# Patient Record
Sex: MALE | Race: OTHER | HISPANIC OR LATINO | URBAN - METROPOLITAN AREA
[De-identification: names, ages, dates, MRNs, and addresses within clinical notes are randomized per-mention and may not be internally consistent; named-entity substitution may affect disease eponyms.]

---

## 2021-10-16 ENCOUNTER — EMERGENCY (EMERGENCY)
Facility: HOSPITAL | Age: 31
LOS: 0 days | Discharge: HOME | End: 2021-10-16
Attending: STUDENT IN AN ORGANIZED HEALTH CARE EDUCATION/TRAINING PROGRAM | Admitting: STUDENT IN AN ORGANIZED HEALTH CARE EDUCATION/TRAINING PROGRAM
Payer: SELF-PAY

## 2021-10-16 VITALS
OXYGEN SATURATION: 97 % | DIASTOLIC BLOOD PRESSURE: 72 MMHG | RESPIRATION RATE: 18 BRPM | HEART RATE: 73 BPM | WEIGHT: 139.99 LBS | TEMPERATURE: 98 F | SYSTOLIC BLOOD PRESSURE: 117 MMHG

## 2021-10-16 VITALS
SYSTOLIC BLOOD PRESSURE: 106 MMHG | DIASTOLIC BLOOD PRESSURE: 53 MMHG | OXYGEN SATURATION: 100 % | RESPIRATION RATE: 18 BRPM | HEART RATE: 91 BPM
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
## 2021-10-16 DIAGNOSIS — R41.82 ALTERED MENTAL STATUS, UNSPECIFIED: ICD-10-CM
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
## 2021-10-16 DIAGNOSIS — F10.129 ALCOHOL ABUSE WITH INTOXICATION, UNSPECIFIED: ICD-10-CM

## 2021-10-16 DIAGNOSIS — S01.01XA LACERATION WITHOUT FOREIGN BODY OF SCALP, INITIAL ENCOUNTER: ICD-10-CM
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
## 2021-10-16 DIAGNOSIS — S01.91XA LACERATION WITHOUT FOREIGN BODY OF UNSPECIFIED PART OF HEAD, INITIAL ENCOUNTER: ICD-10-CM

## 2021-10-16 DIAGNOSIS — S09.90XA UNSPECIFIED INJURY OF HEAD, INITIAL ENCOUNTER: ICD-10-CM

## 2021-10-16 PROCEDURE — 71045 X-RAY EXAM CHEST 1 VIEW: CPT | Mod: 26

## 2021-10-16 PROCEDURE — 99285 EMERGENCY DEPT VISIT HI MDM: CPT | Mod: 25

## 2021-10-16 PROCEDURE — 12004 RPR S/N/AX/GEN/TRK7.6-12.5CM: CPT

## 2021-10-16 PROCEDURE — 70450 CT HEAD/BRAIN W/O DYE: CPT | Mod: 26,MA

## 2021-10-16 PROCEDURE — 72170 X-RAY EXAM OF PELVIS: CPT | Mod: 26

## 2021-10-16 PROCEDURE — 72125 CT NECK SPINE W/O DYE: CPT | Mod: 26,MA

## 2021-10-16 PROCEDURE — 99053 MED SERV 10PM-8AM 24 HR FAC: CPT

## 2021-10-16 RX ORDER — MIDAZOLAM HYDROCHLORIDE 1 MG/ML
5 INJECTION, SOLUTION INTRAMUSCULAR; INTRAVENOUS ONCE
Refills: 0 | Status: DISCONTINUED | OUTPATIENT
Start: 2021-10-16 | End: 2021-10-16

## 2021-10-16 RX ADMIN — MIDAZOLAM HYDROCHLORIDE 5 MILLIGRAM(S): 1 INJECTION, SOLUTION INTRAMUSCULAR; INTRAVENOUS at 05:09

## 2021-10-16 NOTE — ED PROVIDER NOTE - NSFOLLOWUPCLINICS_GEN_ALL_ED_FT
[facility]  Medicine  [address], [place]   Phone: [phone]  Fax:   Follow Up Time: 1-3 Days

## 2021-10-16 NOTE — ED PROVIDER NOTE - PHYSICAL EXAMINATION
CONSTITUTIONAL: intoxicated-appearing  SKIN: warm, dry  HEAD: Normocephalic; +2 4cm lacerations posterior head  EYES: no conjunctival injection  ENT: No nasal discharge  NECK: Supple  CARD: S1, S2 normal; no murmurs, gallops, or rubs. Regular rate and rhythm.   RESP: No wheezes, rales or rhonchi.  ABD: soft ntnd. BS+ in all 4 quadrants.  EXT: Normal ROM.  No clubbing, cyanosis or edema.   PSYCH: Uncooperative, agitated

## 2021-10-16 NOTE — ED PROVIDER NOTE - PATIENT PORTAL LINK FT
You can access the FollowMyHealth Patient Portal offered by [facility] by registering at the following website: [email or web address]. By joining [facility]’s FollowMyHealth portal, you will also be able to view your health information using other applications (apps) compatible with our system.

## 2021-10-16 NOTE — CONSULT NOTE ADULT - ASSESSMENT
ASSESSMENT:  31M w/ no known PMH/PSH  presents to the ED as a trauma alert s/p being found down by neighbors and unconscious.  Trauma workup negative, cleared for discharge from a surgery standpoint.    Plan:  - Cleared from a Trauma perspective  - Dispo per ED      Discussed with senior resident  --------------------------------------------------------------------------------------  10-16-21 @ 06:03    TRAUMA SENIOR SPECTRA: [id number]  TRAUMA TEAM SPECTRA: [id number]     ASSESSMENT:  31M w/ no known PMH/PSH  presents to the ED as a trauma alert s/p being found down by neighbors and unconscious.  Trauma workup negative, cleared for discharge from a surgery standpoint.    Plan:  - CTH and Neck negative   - Cleared from a Trauma perspective  - Dispo per ED      Discussed with senior resident  --------------------------------------------------------------------------------------  10-16-21 @ 06:03    TRAUMA SENIOR SPECTRA: [id number]  TRAUMA TEAM SPECTRA: [id number]    Senior Resident Note  Pt seen and examined - 31M found at bottom of stairs, with posterior scalp lac stapled in ED Trauma workup negative, cleared, dispo per ED  Above note has been reviewed and edited  Plan d/w patient and Dr lisbeth Obrien

## 2021-10-16 NOTE — ED PROVIDER NOTE - PROGRESS NOTE DETAILS
MM: Patient agitated and not cooperative with history taking or exam. Patient swinging at staff, ripping off C collar, and attempting to jump out of bed to leave. 5mg of versed was given in order to obtain imaging. MM: 9 staples were placed on lacerations on right posterior occiput. Trav: Pt observed walking to bathroom, but appeared still a bit unsteady. Will continue to observe and reassess. CP: Patient alert and oriented, and walking steadily unassisted. Given strict return precautions. Patient agreeable to plan.

## 2021-10-16 NOTE — CONSULT NOTE ADULT - SUBJECTIVE AND OBJECTIVE BOX
TRAUMA ACTIVATION LEVEL:  ALERT   ACTIVATED BY: ED  INTUBATED: NO      MECHANISM OF INJURY:   [] Blunt     [] MVC	  [X] Fall	  [] Pedestrian Struck	  [] Motorcycle     [] Assault     [] Bicycle collision    [] Sports injury    [] Penetrating    [] Gun Shot Wound      [] Stab Wound    GCS: 15 	E: 4	V: 5	M: 6    HPI:    31yM w/ unknown PMH/PSH who presents to the ED as a trauma alert s/p being found down by neighbors and unconscious.  Reported to be in his own blood and emesis.  Upon EMS arrival patient was awake and alert but intoxicated appearing.  In the ED patient was in C-Collar but combative and intoxicated appearing.  Patient agitated and ripped off C-Collar multiple times.  Posterior scalp laceration observed, no active bleeding. no other obvious traumatic injuries.    PAST MEDICAL & SURGICAL HISTORY:      Allergies    No Known Allergies    Intolerances        Home Medications:      ROS: 10-system review is otherwise negative except HPI above.      Primary Survey:    A - airway intact  B - bilateral breath sounds and good chest rise  C - palpable pulses in all extremities  D - GCS 15 on arrival, PANCHAL  Exposure obtained    Vital Signs Last 24 Hrs  T(C): 36.4 (16 Oct 2021 04:28), Max: 36.4 (16 Oct 2021 04:28)  T(F): 97.5 (16 Oct 2021 04:28), Max: 97.5 (16 Oct 2021 04:28)  HR: 119 (16 Oct 2021 04:35) (73 - 119)  BP: 112/66 (16 Oct 2021 04:35) (112/66 - 117/72)  BP(mean): --  RR: 18 (16 Oct 2021 04:35) (18 - 18)  SpO2: 100% (16 Oct 2021 04:35) (97% - 100%)    Secondary Survey:   General: NAD, intoxicated, agitated  HEENT: Small posterior scalp laceration  Neck: Soft, midline trachea. no c-spine tenderness  Chest: No chest wall tenderness, no subcutaneous emphysema   Cardiac: RR  Respiratory: Unlabored breathing at rest  Abdomen: Soft, non-distended, non-tender, no rebound, no guarding.  Groin: Normal appearing, pelvis stable   Ext:  Moving b/l upper and lower extremities. Palpable Radial b/l UE, b/l DP palpable in LE.   Back: No T/L/S spine tenderness, No palpable runoff/stepoff/deformity      Labs:  CAPILLARY BLOOD GLUCOSE      POCT Blood Glucose.: 96 mg/dL (16 Oct 2021 04:33)              RADIOLOGY & ADDITIONAL STUDIES:  ---------------------------------------------------------------------------------------  < from: Xray Pelvis AP only (10.16.21 @ 05:29) >    IMPRESSION:    Telemetry leads overlie the right hemipelvis and hip. No acute fracture or dislocation.    --- End of Report ---      < end of copied text >  < from: CT Cervical Spine No Cont (10.16.21 @ 05:17) >    IMPRESSION:    No evidence of acute fracture of the cervical spine.    --- End of Report ---      < end of copied text >  < from: CT Head No Cont (10.16.21 @ 05:17) >    IMPRESSION:      No CT evidence of acute intracranial hemorrhage, midline shift, or mass effect.    Large right parietal scalp hematoma.    --- End of Report ---    < end of copied text >       TRAUMA ACTIVATION LEVEL:  ALERT   ACTIVATED BY: ED  INTUBATED: NO      MECHANISM OF INJURY:   [] Blunt     [] MVC	  [X] Fall	  [] Pedestrian Struck	  [] Motorcycle     [] Assault     [] Bicycle collision    [] Sports injury    [] Penetrating    [] Gun Shot Wound      [] Stab Wound    GCS: 15 	E: 4	V: 5	M: 6    HPI:    31yM w/ unknown PMH/PSH who presents to the ED as a trauma alert s/p being found down by neighbors and unconscious.  Reported to be in his own blood and emesis.  Upon EMS arrival patient was awake and alert but intoxicated appearing.  In the ED patient was in C-Collar but combative and intoxicated appearing.  Patient agitated and ripped off C-Collar multiple times.  Posterior scalp laceration observed, no active bleeding. no other obvious traumatic injuries.    PAST MEDICAL & SURGICAL HISTORY:      Allergies: No Known Allergies    Home Medications:      ROS: 10-system review is otherwise negative except HPI above.      Primary Survey:    A - airway intact  B - bilateral breath sounds and good chest rise  C - palpable pulses in all extremities  D - GCS 15 on arrival, PANCHAL  Exposure obtained    Vital Signs Last 24 Hrs  T(C): 36.4 (16 Oct 2021 04:28), Max: 36.4 (16 Oct 2021 04:28)  T(F): 97.5 (16 Oct 2021 04:28), Max: 97.5 (16 Oct 2021 04:28)  HR: 119 (16 Oct 2021 04:35) (73 - 119)  BP: 112/66 (16 Oct 2021 04:35) (112/66 - 117/72)  BP(mean): --  RR: 18 (16 Oct 2021 04:35) (18 - 18)  SpO2: 100% (16 Oct 2021 04:35) (97% - 100%)    Secondary Survey:   General: NAD, intoxicated, agitated  HEENT: Small posterior scalp laceration  Neck: Soft, midline trachea. no c-spine tenderness  Chest: No chest wall tenderness, no subcutaneous emphysema   Cardiac: RR  Respiratory: Unlabored breathing at rest  Abdomen: Soft, non-distended, non-tender, no rebound, no guarding.  Groin: Normal appearing, pelvis stable   Ext:  Moving b/l upper and lower extremities. Palpable Radial b/l UE, b/l DP palpable in LE.   Back: No T/L/S spine tenderness, No palpable runoff/stepoff/deformity    RADIOLOGY & ADDITIONAL STUDIES:  ---------------------------------------------------------------------------------------  < from: Xray Pelvis AP only (10.16.21 @ 05:29) >  IMPRESSION:  Telemetry leads overlie the right hemipelvis and hip. No acute fracture or dislocation.  --- End of Report ---    < end of copied text >  < from: CT Cervical Spine No Cont (10.16.21 @ 05:17) >  IMPRESSION:  No evidence of acute fracture of the cervical spine.  --- End of Report ---    < end of copied text >  < from: CT Head No Cont (10.16.21 @ 05:17) >  IMPRESSION:  No CT evidence of acute intracranial hemorrhage, midline shift, or mass effect.  Large right parietal scalp hematoma.  --- End of Report ---  < end of copied text >

## 2021-10-16 NOTE — ED PROVIDER NOTE - NSFOLLOWUPINSTRUCTIONS_ED_ALL_ED_FT
Please return for staple removal in 7-10 days.      Traumatismo craneal    LO QUE NECESITA SABER:    Un traumatismo craneal puede incluir el cuero cabelludo, la jodie, el cráneo o el cerebro y puede variar de leve a grave. Los efectos pueden aparecer inmediatamente después de la lesión o desarrollarse más tarde. Los efectos pueden durar poco tiempo o ser permanentes. Es posible que los médicos quieran revisar holland recuperación con el tiempo. El tratamiento puede cambiar a medida que usted se recupera o desarrolla nuevos problemas de donaldo por el traumatismo craneal.    INSTRUCCIONES SOBRE EL KRISTINA HOSPITALARIA:    Llame al número local de emergencias (911 en los [place]), o pídale a alguien que llame si:  •No es posible despertarlo.      •Usted sufre kendrick convulsión.      •Usted angely de reaccionar cuando le hablan o se desmaya.      •Usted tiene visión borrosa o doble.      •Usted arrastra las palabras o se confunde al hablar.      •Usted tiene debilidad en holland brazo o pierna, pierde la sensación o presenta nuevos problemas de coordinación.      •Dixie pupilas son más grandes de lo habitual o kendrick pupila es de tamaño diferente de la otra.      •A usted le sale keyur o un líquido aldo de los oídos o la nariz.      Regrese a la raj de emergencias si:  •Usted tiene vómitos reiterados o isaiah.      •Se siente confundido.      •El dolor de donovan empeora o se vuelve intenso.      •Usted o kendrick persona que lo cuida nota que le osmin más despertarse que de costumbre.      Llame a holland médico si:  •Dixie síntomas paula más de 6 semanas después de la lesión.      •Usted tiene preguntas o inquietudes acerca de holland condición o cuidado.      Medicamentos:  •Acetaminofénalivia el dolor y baja la fiebre. Está disponible sin receta médica. Pregunte la cantidad y la frecuencia con que debe tomarlos. Siga las indicaciones. Marily las etiquetas de todos los demás medicamentos que esté usando para saber si también contienen acetaminofén, o pregunte a holland médico o farmacéutico. El acetaminofén puede causar daño en el hígado cuando no se liyah de forma correcta. No use más de 4 gramos (4000 miligramos) en total de acetaminofeno en un día.      •[place]ell dixie medicamentos eloy se le haya indicado.Consulte con holland médico si usted milly que holland medicamento no le está ayudando o si presenta efectos secundarios. Infórmele si es alérgico a cualquier medicamento. Mantenga kendrick lista actualizada de los medicamentos, las vitaminas y los productos herbales que liyah. Incluya los siguientes datos de los medicamentos: cantidad, frecuencia y motivo de administración. Traiga con usted la lista o los envases de las píldoras a dixie citas de seguimiento. Lleve la lista de los medicamentos con usted en ross de kendrick emergencia.      Cuidados personales:  •Descanseo faraz actividades tranquilas. Limite holland tiempo viendo la televisión, utilizando la computadora o realizando tareas que requieren mucho pensamiento. Regrese lentamente dixie actividades acostumbradas eloy le indiquen. No practique deportes ni faraz actividades que puedan provocarle un golpe en la donovan. Pregunte a holland médico cuándo puede regresar al deporte.      •Aplique hieloen la donovan de 15 a 20 minutos cada hora o eloy se le indique. Use kendrick compresa de hielo o ponga hielo triturado en kendrick bolsa de plástico. Cúbralo con kendrick toalla antes de aplicarlo sobre holland piel. El hielo ayuda a evitar daño al tejido y a disminuir la inflamación y el dolor.      •Solicite que alguien se quede con usted por 24 horas, o eloy se le indique. Esta persona puede monitorearlo para detectar problemas y pedir ayuda si es necesario. Cuando se despierte, gustabo persona debe hacerle algunas preguntas cada pocas horas para lori si usted está pensando con claridad. Un ejemplo es preguntarle holland nombre o holland dirección.      Prevenga otro traumatismo craneal:  •Use un celestine que se ajuste correctamente.Faraz esto cuando practica deportes, o gilles kendrick bicicleta, scooter o patineta. Los cascos ayudan a disminuir el riesgo de un traumatismo craneal grave. Hable con holland médico acerca de otras maneras en las que usted puede protegerse si practica deportes.      •Use el cinturón de seguridad cada vez que esté en un automóvil.[place] ayuda a disminuir el riesgo de sufrir un traumatismo craneal si tiene un accidente.      Acuda a la consulta de control con holland médico según las indicaciones:Anote dixie preguntas para que se acuerde de hacerlas lata dixie visitas.

## 2021-10-16 NOTE — ED PROVIDER NOTE - ATTENDING CONTRIBUTION TO CARE
sp fall, found on ground. bib ems. trauma alert called. pt intoxicated.    right scalp hematoma.   neck nt, perrl, eomi, cta, rrr, ab soft, nt.  spine nt.   gcs 15. slurred spch.    labs, imaging, trauma eval.

## 2021-10-16 NOTE — ED ADULT TRIAGE NOTE - CHIEF COMPLAINT QUOTE
Pt BIBA found pt at bottom of steps with 2 lacs to back of head, pt intox and altered. Unwitnessed fall. EMS endorses call came over as pt unconscious.  Trauma alert called.

## 2021-10-16 NOTE — ED PROVIDER NOTE - OBJECTIVE STATEMENT
Patient is a 31 year old Afghan speaking male with no past medical history brought in by EMS after being called for the patient being unresponsive at the bottom of stairs. When EMS arrived, patient was alert and intoxicated, with 2 3cm bleeding lacerations on the right side back of his head. No other injuries noted.

## 2021-10-22 ENCOUNTER — EMERGENCY (EMERGENCY)
Facility: HOSPITAL | Age: 31
LOS: 0 days | Discharge: HOME | End: 2021-10-22
Attending: EMERGENCY MEDICINE | Admitting: EMERGENCY MEDICINE
Payer: SELF-PAY

## 2021-10-22 VITALS
HEART RATE: 77 BPM | OXYGEN SATURATION: 99 % | DIASTOLIC BLOOD PRESSURE: 68 MMHG | SYSTOLIC BLOOD PRESSURE: 120 MMHG | RESPIRATION RATE: 18 BRPM | TEMPERATURE: 98 F
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
## 2021-10-22 DIAGNOSIS — Z48.02 ENCOUNTER FOR REMOVAL OF SUTURES: ICD-10-CM
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
## 2021-10-22 DIAGNOSIS — S01.91XD LACERATION WITHOUT FOREIGN BODY OF UNSPECIFIED PART OF HEAD, SUBSEQUENT ENCOUNTER: ICD-10-CM

## 2021-10-22 PROCEDURE — L9995: CPT

## 2021-10-22 NOTE — ED PROVIDER NOTE - ATTENDING CONTRIBUTION TO CARE
I personally evaluated patient. I agree with the findings and plan with all documentation on chart except as documented  in my note.    I spoke with patient in [place] and last chart was reviewed. Laceration well healed and no signs of infection. Staples removed.   Full DC instructions discussed and patient knows when to seek immediate medical attention.  Patient has proper follow up.

## 2021-10-22 NOTE — ED PROVIDER NOTE - OBJECTIVE STATEMENT
32 y/o M presents to the ED today for staple removal and wound check. pt had 9 staples placed 9 days ago to the right posterior aspect of his head. Today he is deneying any fever, chills, pain at site, drainage from wound.

## 2021-10-22 NOTE — ED PROVIDER NOTE - PATIENT PORTAL LINK FT
You can access the FollowMyHealth Patient Portal offered by [facility] by registering at the following website: [email or web address]. By joining [facility]’s FollowMyHealth portal, you will also be able to view your health information using other applications (apps) compatible with our system.

## 2021-10-22 NOTE — ED PROVIDER NOTE - NS ED ROS FT
Constitutional: (-) fever  Eyes/ENT: (-) blurry vision, (-) epistaxis  Cardiovascular: (-) chest pain, (-) syncope  Respiratory:  (-) shortness of breath  Gastrointestinal: (-) vomiting, (-) diarrhea  Musculoskeletal: (-) neck pain, (-) back pain, (-) joint pain  Neurological: (-) headache

## 2021-10-22 NOTE — ED PROVIDER NOTE - PHYSICAL EXAMINATION
Physical Exam    Vital Signs: I have reviewed the initial vital signs.  Constitutional: well-nourished, appears stated age, no acute distress  Musculoskeletal: supple neck, no lower extremity edema, no midline tenderness  Integumentary: warm, dry, healed wound to right posterior aspect of head, no visible drainage or swelling, no wound dehiscences

## 2021-10-22 NOTE — ED PROVIDER NOTE - CLINICAL SUMMARY MEDICAL DECISION MAKING FREE TEXT BOX
I spoke with patient in [place] and last chart was reviewed. Laceration well healed and no signs of infection. Staples removed.   Full DC instructions discussed and patient knows when to seek immediate medical attention.  Patient has proper follow up.

## 2021-10-22 NOTE — ED PROVIDER NOTE - PROGRESS NOTE DETAILS
staples removed wound looks healed no discharge or signs of infection, plan to D/C I supervised PA fellow care

## 2022-11-15 NOTE — ED ADULT NURSE NOTE - NSIMPLEMENTINTERV_GEN_ALL_ED
Implemented All Universal Safety Interventions:  [place] to call system. Call bell, personal items and telephone within reach. Instruct patient to call for assistance. Room bathroom lighting operational. Non-slip footwear when patient is off stretcher. Physically safe environment: no spills, clutter or unnecessary equipment. Stretcher in lowest position, wheels locked, appropriate side rails in place. [FreeTextEntry1] : Plan of care\par -Rx Naproxen 500mg BID for severe pain\par -Rx Prednisone 50mg x5 days for asthma\par -rest, hydration, tea with honey, humidifier by bed, continue with Tylenol as needed\par -schedule close follow up with primary care doctor\par -anticipatory guidance discuss with patient in regards to severe signs and symptoms of Covid and when to go to the emergency department
